# Patient Record
Sex: MALE | Race: WHITE | NOT HISPANIC OR LATINO | URBAN - METROPOLITAN AREA
[De-identification: names, ages, dates, MRNs, and addresses within clinical notes are randomized per-mention and may not be internally consistent; named-entity substitution may affect disease eponyms.]

---

## 2020-09-15 ENCOUNTER — TRANSCRIBE ORDERS (OUTPATIENT)
Dept: URGENT CARE | Facility: CLINIC | Age: 35
End: 2020-09-15

## 2020-09-15 DIAGNOSIS — Z02.1 ENCOUNTER FOR PRE-EMPLOYMENT EXAMINATION: Primary | ICD-10-CM

## 2020-09-15 PROCEDURE — 86480 TB TEST CELL IMMUN MEASURE: CPT | Performed by: FAMILY MEDICINE

## 2020-12-19 ENCOUNTER — IMMUNIZATIONS (OUTPATIENT)
Dept: FAMILY MEDICINE CLINIC | Facility: HOSPITAL | Age: 35
End: 2020-12-19
Payer: COMMERCIAL

## 2020-12-19 DIAGNOSIS — Z23 ENCOUNTER FOR IMMUNIZATION: ICD-10-CM

## 2020-12-19 PROCEDURE — 91300 SARS-COV-2 / COVID-19 MRNA VACCINE (PFIZER-BIONTECH) 30 MCG: CPT

## 2020-12-19 PROCEDURE — 0001A SARS-COV-2 / COVID-19 MRNA VACCINE (PFIZER-BIONTECH) 30 MCG: CPT

## 2021-01-07 ENCOUNTER — IMMUNIZATIONS (OUTPATIENT)
Dept: FAMILY MEDICINE CLINIC | Facility: HOSPITAL | Age: 36
End: 2021-01-07

## 2021-01-07 DIAGNOSIS — Z23 ENCOUNTER FOR IMMUNIZATION: ICD-10-CM

## 2021-01-07 PROCEDURE — 0002A SARS-COV-2 / COVID-19 MRNA VACCINE (PFIZER-BIONTECH) 30 MCG: CPT

## 2021-01-07 PROCEDURE — 91300 SARS-COV-2 / COVID-19 MRNA VACCINE (PFIZER-BIONTECH) 30 MCG: CPT

## 2021-04-27 ENCOUNTER — TELEPHONE (OUTPATIENT)
Dept: NEUROLOGY | Facility: CLINIC | Age: 36
End: 2021-04-27

## 2021-04-27 NOTE — TELEPHONE ENCOUNTER
Spoke with Mr Juliana Das who is self referring Headache / Vascular Imaging [facility] I will contact patient to schedule

## 2021-04-27 NOTE — TELEPHONE ENCOUNTER
Best contact number for MNCTULQ:[phone]    Emergency Contact name and number:None    Referring provider and telephone number: Self Ref [address] [facility]     Primary Care Provider Name and if affiliated with [facility]  Luke's: Currently has no pcp     Reason for Appointment/Dx:Headache     Have you seen and followed up with a pediatric Neurologist for this disease in the past?  No        Neurology Location patient would like to be seen:[facility] received? Self Ref [address] [facility]                                                Records Received? No    Have you ever seen another Neurologist?       No    Insurance Information    Insurance Name:[address]     ID/Policy #:    Secondary Insurance:    ID/Policy#: Workman's Comp/ Accident/ School  Information      Workman's Comp/Accident/School related?        None    If yes name of Insurance company:    Claim #:    Date of Injury:    Type of Injury:     Name and Telephone Number:    Notes:Appointment schedule with patient new patient pack sent  Self Ref patient [facility]                          Appointment date: 05- 4:00pm with Dr Glenford Brunner

## 2021-05-04 ENCOUNTER — CONSULT (OUTPATIENT)
Dept: NEUROLOGY | Facility: CLINIC | Age: 36
End: 2021-05-04
Payer: COMMERCIAL

## 2021-05-04 VITALS
SYSTOLIC BLOOD PRESSURE: 125 MMHG | WEIGHT: 167.1 LBS | DIASTOLIC BLOOD PRESSURE: 77 MMHG | HEIGHT: 75 IN | HEART RATE: 95 BPM | BODY MASS INDEX: 20.78 KG/M2 | TEMPERATURE: 98.3 F

## 2021-05-04 DIAGNOSIS — G44.019 EPISODIC CLUSTER HEADACHE, NOT INTRACTABLE: Primary | ICD-10-CM

## 2021-05-04 PROCEDURE — 99204 OFFICE O/P NEW MOD 45 MIN: CPT | Performed by: PSYCHIATRY & NEUROLOGY

## 2021-05-04 PROCEDURE — 3008F BODY MASS INDEX DOCD: CPT | Performed by: PSYCHIATRY & NEUROLOGY

## 2021-05-04 RX ORDER — SUMATRIPTAN 6 MG/.5ML
6 INJECTION, SOLUTION SUBCUTANEOUS DAILY PRN
Qty: 4 ML | Refills: 1 | Status: SHIPPED | OUTPATIENT
Start: 2021-05-04

## 2021-05-04 RX ORDER — INDOMETHACIN 50 MG/1
50 CAPSULE ORAL DAILY PRN
Qty: 60 CAPSULE | Refills: 0 | Status: SHIPPED | OUTPATIENT
Start: 2021-05-04

## 2021-05-04 NOTE — PATIENT INSTRUCTIONS
Trigeminal autonomic cephalalgia:  Dr Chelsea Russo presents for an initial consultation with regard to what sounds like a trigeminal autonomic cephalalgia  In particular this could be consistent with either cluster headache or paroxysmal hemicrania although I would typically favor the former of the 2  He has been doing well for the last 3 nights with the assistance of melatonin and previously had been using rise a triptan with limited benefit  His neurologic exam is excellent in the office today  - for ongoing prevention for the time being he would be cleared from my standpoint to continue his current dose of melatonin at 10 mg at night  If he remains headache free for 3-4 weeks would be reasonable to start to reduce the dose slowly and wean to off     - if he does have a breakthrough cluster he should use 6 mg of subcutaneous sumatriptan immediately or as early as possible  He could potentially combined that with indomethacin 50 mg however considering that these headaches are occurring in the middle of the night his stomach is likely to be empty  He could consider taking the medication with a little milk or antacid to help protect his stomach  - if he has ongoing breakthrough clusters will likely consider continuous indomethacin 3 times daily, Emgality which has the FDA approval for cluster headache, or a steroid bridge  Anyone of the 3 would be reasonable potentially   -at this point he is not in need of cerebrovascular imaging  I will plan for him to return to see me in 6 months time however if he has any breakthroughs whatsoever I would like a phone call and we will consider at that point in time whether not he would like to begin a preventative medication for a time

## 2021-05-04 NOTE — PROGRESS NOTES
Patient ID: Anne Marie Escamilla is a 37 y o  male  Assessment/Plan:   Patient Instructions   Trigeminal autonomic cephalalgia:  Dr Ham Bray presents for an initial consultation with regard to what sounds like a trigeminal autonomic cephalalgia  In particular this could be consistent with either cluster headache or paroxysmal hemicrania although I would typically favor the former of the 2  He has been doing well for the last 3 nights with the assistance of melatonin and previously had been using rise a triptan with limited benefit  His neurologic exam is excellent in the office today  - for ongoing prevention for the time being he would be cleared from my standpoint to continue his current dose of melatonin at 10 mg at night  If he remains headache free for 3-4 weeks would be reasonable to start to reduce the dose slowly and wean to off     - if he does have a breakthrough cluster he should use 6 mg of subcutaneous sumatriptan immediately or as early as possible  He could potentially combined that with indomethacin 50 mg however considering that these headaches are occurring in the middle of the night his stomach is likely to be empty  He could consider taking the medication with a little milk or antacid to help protect his stomach  - if he has ongoing breakthrough clusters will likely consider continuous indomethacin 3 times daily, Emgality which has the FDA approval for cluster headache, or a steroid bridge  Anyone of the 3 would be reasonable potentially   -at this point he is not in need of cerebrovascular imaging  I will plan for him to return to see me in 6 months time however if he has any breakthroughs whatsoever I would like a phone call and we will consider at that point in time whether not he would like to begin a preventative medication for a time  No problem-specific Assessment & Plan notes found for this encounter         Diagnoses and all orders for this visit:    Episodic cluster headache, not intractable  -     SUMAtriptan (IMITREX) 6 mg/0 5 mL; Inject 0 5 mL (6 mg total) under the skin daily as needed (for cluster )  -     indomethacin (INDOCIN) 50 mg capsule; Take 1 capsule (50 mg total) by mouth daily as needed (for cluster attack)           Subjective:    HPI     For Evelio Montez presents for an initial consultation with regard to headaches  He reports that over the course of the last few weeks he has started to experience what sound like episodes of trigeminal autonomic cephalalgia potentially cluster headache  These episodes occur only at night and typically wake him up regularly at midnight  The pain is extremely severe affecting all in the left brinda orbital/retro-orbital region  The pain is extremely severe  He is accompanied by unilateral autonomic symptoms including constant Yovany injection, tearing, rhinorrhea  The symptoms themselves may last for approximately 1 hour with rise a triptan  He does have a brother with an AVM but no family history of aneurysm  He did have 1 prior episode of 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  He was able to treat this/get it to resolve  He reports that he had an MRI during that initial episode  We discussed several different strategies for prevention and treatment as described above  Although he had been experiencing headache every single night he has not had 1 in the last 3 days since he started melatonin at a dose of 10 mg taken once daily at night  No past medical history on file      Social History     Socioeconomic History    Marital status: Unknown     Spouse name: Not on file    Number of children: Not on file    Years of education: Not on file    Highest education level: Not on file   Occupational History    Not on file   Social Needs    Financial resource strain: Not on file    Food insecurity     Worry: Not on file     Inability: Not on file   [employer] needs Medical: Not on file     Non-medical: Not on file   Tobacco Use    Smoking status: Not on file   Substance and Sexual Activity    Alcohol use: Not on file    Drug use: Not on file    Sexual activity: Not on file   Lifestyle    Physical activity     Days per week: Not on file     Minutes per session: Not on file    Stress: Not on file   Relationships    Social connections     Talks on phone: Not on file     Gets together: Not on file     Attends Oriental orthodox service: Not on file     Active member of club or organization: Not on file     Attends meetings of clubs or organizations: Not on file     Relationship status: Not on file    Intimate partner violence     Fear of current or ex partner: Not on file     Emotionally abused: Not on file     Physically abused: Not on file     Forced sexual activity: Not on file   Other Topics Concern    Not on file   Social History Narrative    Not on file       No current outpatient medications on file  Not on File          Objective:    /77 (BP Location: Left arm, Patient Position: Sitting, Cuff Size: Standard)   Pulse 95   Temp 98 3 °F (36 8 °C)   Ht 6' 3" (1 905 m)   Wt 75 8 kg (167 lb 1 6 oz)   BMI 20 89 kg/m²       Physical Exam    Neurological Exam     At the time of my examination he is awake, alert, and in no distress  There is no dysarthria or aphasia  Cranial nerves 2-12 were symmetrically intact bilaterally  Motor testing reveals 5/5 strength throughout the bilateral upper and lower extremities  Finger-to-nose reveals no ataxia, tremor, or loss of proprioceptive function  Sensation is intact to temperature and vibration in the bilateral upper and lower extremities  Funduscopic exam reveals no papilledema bilaterally  Deep tendon reflexes are symmetric  His gait is stable  ROS:    Review of Systems   Constitutional: Negative  Negative for appetite change and fever  HENT: Negative    Negative for hearing loss, tinnitus, trouble swallowing and voice change  Eyes: Positive for pain  Negative for photophobia  Respiratory: Negative  Negative for shortness of breath  Cardiovascular: Negative  Negative for palpitations  Gastrointestinal: Negative  Negative for nausea and vomiting  Endocrine: Negative  Negative for cold intolerance  Genitourinary: Negative  Negative for dysuria, frequency and urgency  Musculoskeletal: Negative  Negative for myalgias and neck pain  Skin: Negative  Negative for rash  Neurological: Positive for headaches  Negative for dizziness, tremors, seizures, syncope, facial asymmetry, speech difficulty, weakness, light-headedness and numbness  Hematological: Negative  Does not bruise/bleed easily  Psychiatric/Behavioral: Negative  Negative for confusion, hallucinations and sleep disturbance  Reviewed ROS as entered by medical assistant

## 2021-12-16 ENCOUNTER — IMMUNIZATIONS (OUTPATIENT)
Dept: FAMILY MEDICINE CLINIC | Facility: HOSPITAL | Age: 36
End: 2021-12-16

## 2021-12-16 DIAGNOSIS — Z23 ENCOUNTER FOR IMMUNIZATION: Primary | ICD-10-CM

## 2021-12-16 PROCEDURE — 0001A COVID-19 PFIZER VACC 0.3 ML: CPT

## 2021-12-16 PROCEDURE — 91300 COVID-19 PFIZER VACC 0.3 ML: CPT

## 2021-12-21 ENCOUNTER — TELEPHONE (OUTPATIENT)
Dept: INTERNAL MEDICINE CLINIC | Facility: CLINIC | Age: 36
End: 2021-12-21

## 2021-12-21 DIAGNOSIS — Z20.822 ENCOUNTER FOR SCREENING LABORATORY TESTING FOR COVID-19 VIRUS IN ASYMPTOMATIC PATIENT: Primary | ICD-10-CM

## 2021-12-21 PROCEDURE — U0005 INFEC AGEN DETEC AMPLI PROBE: HCPCS | Performed by: INTERNAL MEDICINE

## 2021-12-21 PROCEDURE — U0003 INFECTIOUS AGENT DETECTION BY NUCLEIC ACID (DNA OR RNA); SEVERE ACUTE RESPIRATORY SYNDROME CORONAVIRUS 2 (SARS-COV-2) (CORONAVIRUS DISEASE [COVID-19]), AMPLIFIED PROBE TECHNIQUE, MAKING USE OF HIGH THROUGHPUT TECHNOLOGIES AS DESCRIBED BY CMS-2020-01-R: HCPCS | Performed by: INTERNAL MEDICINE

## 2022-10-13 DIAGNOSIS — H57.12 LEFT EYE PAIN: Primary | ICD-10-CM

## 2022-10-13 RX ORDER — TOBRAMYCIN AND DEXAMETHASONE 3; 1 MG/ML; MG/ML
1 SUSPENSION/ DROPS OPHTHALMIC
Qty: 5 ML | Refills: 0 | Status: SHIPPED | OUTPATIENT
Start: 2022-10-13

## 2022-10-31 ENCOUNTER — NEW PATIENT (OUTPATIENT)
Dept: URBAN - METROPOLITAN AREA CLINIC 6 | Facility: CLINIC | Age: 37
End: 2022-10-31

## 2022-10-31 DIAGNOSIS — S05.01XA: ICD-10-CM

## 2022-10-31 PROCEDURE — 92002 INTRM OPH EXAM NEW PATIENT: CPT

## 2022-10-31 ASSESSMENT — VISUAL ACUITY
OD_SC: 20/40
OS_SC: 20/30

## 2022-12-20 DIAGNOSIS — R09.81 CONGESTION OF NASAL SINUS: Primary | ICD-10-CM

## 2022-12-20 RX ORDER — AZITHROMYCIN 250 MG/1
TABLET, FILM COATED ORAL
Qty: 6 TABLET | Refills: 0 | Status: SHIPPED | OUTPATIENT
Start: 2022-12-20 | End: 2022-12-24

## 2022-12-20 RX ORDER — AZITHROMYCIN 250 MG/1
TABLET, FILM COATED ORAL
Qty: 6 TABLET | Refills: 0 | Status: SHIPPED | OUTPATIENT
Start: 2022-12-20 | End: 2022-12-20

## 2023-03-17 DIAGNOSIS — R09.81 CONGESTION OF NASAL SINUS: Primary | ICD-10-CM

## 2023-03-17 RX ORDER — AMOXICILLIN 500 MG/1
500 CAPSULE ORAL EVERY 8 HOURS SCHEDULED
Qty: 21 CAPSULE | Refills: 0 | Status: SHIPPED | OUTPATIENT
Start: 2023-03-17 | End: 2023-03-17

## 2023-03-17 RX ORDER — AMOXICILLIN 500 MG/1
500 CAPSULE ORAL EVERY 8 HOURS SCHEDULED
Qty: 21 CAPSULE | Refills: 0 | Status: SHIPPED | OUTPATIENT
Start: 2023-03-17 | End: 2023-03-24

## 2023-06-19 DIAGNOSIS — Z20.1 EXPOSURE TO MYCOBACTERIUM TUBERCULOSIS: Primary | ICD-10-CM

## 2023-06-20 ENCOUNTER — APPOINTMENT (OUTPATIENT)
Dept: LAB | Facility: CLINIC | Age: 38
End: 2023-06-20

## 2023-06-20 PROCEDURE — 36415 COLL VENOUS BLD VENIPUNCTURE: CPT

## 2023-06-20 PROCEDURE — 86480 TB TEST CELL IMMUN MEASURE: CPT

## 2023-06-22 LAB
GAMMA INTERFERON BACKGROUND BLD IA-ACNC: 0.04 IU/ML
M TB IFN-G BLD-IMP: NEGATIVE
M TB IFN-G CD4+ BCKGRND COR BLD-ACNC: 0 IU/ML
M TB IFN-G CD4+ BCKGRND COR BLD-ACNC: 0 IU/ML
MITOGEN IGNF BCKGRD COR BLD-ACNC: >10 IU/ML

## 2023-08-10 ENCOUNTER — OFFICE VISIT (OUTPATIENT)
Dept: CARDIOLOGY CLINIC | Facility: CLINIC | Age: 38
End: 2023-08-10
Payer: COMMERCIAL

## 2023-08-10 VITALS
SYSTOLIC BLOOD PRESSURE: 118 MMHG | DIASTOLIC BLOOD PRESSURE: 72 MMHG | WEIGHT: 170 LBS | BODY MASS INDEX: 21.25 KG/M2 | OXYGEN SATURATION: 100 % | HEART RATE: 91 BPM

## 2023-08-10 DIAGNOSIS — I48.0 PAROXYSMAL ATRIAL FIBRILLATION (HCC): Primary | ICD-10-CM

## 2023-08-10 PROCEDURE — 99244 OFF/OP CNSLTJ NEW/EST MOD 40: CPT | Performed by: INTERNAL MEDICINE

## 2023-08-10 RX ORDER — OMEPRAZOLE 20 MG/1
20 CAPSULE, DELAYED RELEASE ORAL
COMMUNITY

## 2023-08-10 RX ORDER — METOPROLOL SUCCINATE 25 MG/1
25 TABLET, EXTENDED RELEASE ORAL DAILY
COMMUNITY
Start: 2023-08-10

## 2023-08-13 PROCEDURE — 93000 ELECTROCARDIOGRAM COMPLETE: CPT | Performed by: INTERNAL MEDICINE

## 2023-08-13 NOTE — PROGRESS NOTES
Consultation - Cardiology   Saira Larsen 30 y.o. male MRN: [id number]  Unit/Bed#:  Encounter: [id number]  Physician Requesting Consult: No att. providers found  Reason for Consult / Principal Problem:   PAF      Assessment:  1. PAF    Plan:  Initial short term plan was to continue Toprol XL 25 mg daily, get an EST, sleep study, echo. Then d/c Toprol and uses Flecainide as needed ( 300 mg ) for prolonged AF episodes. AF ablation was discussed with him and he is interested. After I discussed the case with EP ( Dr Hardy Kenney ), I called the patient and informed his that AF ablation would be a very good 1st line option. He is agreeable to discuss further with Dr Hardy Kenney. I told him to continue on the Toprol XL 25 mg daily, cancel the EST and echo but still get the sleep study. RTO 3 months. History of Present Illness     HPI: Saira Larsen is a 30y.o. year old male. He denies any past cardiac history or significant past medical history. He denies HTN, DM, smoking, vascular disease, thyroid disease, lung disease. He does not smoke. He drinks alcohol socially. On 8/8/2023, he had abrupt onset of tachycardia last about 45 minutes and then resolving spontaneously. He felt SOB and weak. After the episode stopped he was " wiped out ". On 8/9/2023, he had the same thing happen which lasted over an hour and he went to the ER in [facility]. Telemetry showed PAF with HRs up to 150 - 160 BPM. Documentation strips are in [facility]. He left the ER in [place] and was started on Toprol XL 25 mg daily. He has not no further episodes. CHADS-VAS zero. He does snore and his wife who is a MD has suggested that he get a sleep study. He has 1 cup of coffee / day and one Pepsi.     PMH - cluster headaches    ECHO 2/20/2020 - EF 60%, normal          Review of Systems:    Alert awake oriented, comfortable, denies any complaints  No fevers chills nausea vomiting  No weakness, dizziness, seizures  no cough, shortness of breath, or wheezing  Denies any palpitations, chest pain, diaphoresis  Denies leg edema, pain or paresthesias  Denies any skin rashes  Denies abdominal pain, bloody stools, masses  Denies any depression or suicidal ideations      Historical Information   No past medical history on file. No past surgical history on file. Social History     Substance and Sexual Activity   Alcohol Use Not on file     Social History     Substance and Sexual Activity   Drug Use Not on file     Social History     Tobacco Use   Smoking Status Not on file   Smokeless Tobacco Not on file     Family History: non-contributory    Meds/Allergies   all current active meds have been reviewed  No Known Allergies    Objective   Vitals: Blood pressure 118/72, pulse 91, weight 77.1 kg (170 lb), SpO2 100 %. , Body mass index is 21.25 kg/m². ,   Weight (last 2 days)     None                    Physical Exam:  GEN: Orion Pennington appears well, alert and oriented x 3, pleasant and cooperative   HEENT: pupils equal, round, and reactive to light; extraocular muscles intact  NECK: supple, no carotid bruits   HEART: regular rhythm, normal S1 and S2, no murmurs, clicks, gallops or rubs   LUNGS: clear to auscultation bilaterally; no wheezes, rales, or rhonchi   ABDOMEN: normal bowel sounds, soft, no tenderness, no distention  EXTREMITIES: peripheral pulses normal; no clubbing, cyanosis, or edema  NEURO: no focal findings   SKIN: normal without suspicious lesions on exposed skin    Lab Results:   No visits with results within 1 Day(s) from this visit.    Latest known visit with results is:   Orders Only on 06/19/2023   Component Date Value Ref Range Status   • QFT Nil 06/20/2023 0.04  0 - 8.0 IU/ml Final   • QFT TB1-NIL 06/20/2023 0.00  IU/ml Final   • QFT TB2-NIL 06/20/2023 0.00  IU/ml Final   • QFT Mitogen-NIL 06/20/2023 >10.00  IU/ml Final   • QFT Final Interpretation 06/20/2023 Negative  Negative Final    No Interferon-gamma response to M. tuberculosis antigens detected. Infection with M. tuberculosis is unlikely. A single negative result does not exclude infection with M. tuberculosis. In patients at high risk for M. tuberculosis infection, a second test should be considered in accordance with the 2017 ATS/IDSA/CDC Clinical Practice Guidelines for Diagnosis of Tuberculosis in Adults and Children. False negative results can be a result of incorrect blood sample collection or handling of the specimen affecting lymphocyte function. Imaging: I have personally reviewed pertinent reports.

## 2023-08-14 ENCOUNTER — TELEPHONE (OUTPATIENT)
Dept: SLEEP CENTER | Facility: CLINIC | Age: 38
End: 2023-08-14

## 2023-08-14 NOTE — TELEPHONE ENCOUNTER
----- Message from Bethany Anderson MD sent at 8/12/2023 11:27 PM EDT -----  Approved    ----- Message -----  From: Yaritza Olmos  Sent: 8/11/2023   7:33 AM EDT  To: Sleep Medicine [facility] Provider    This Home sleep study needs approval.     If approved please sign and return to clerical pool. If denied please include reasons why. Also provide alternative testing if warranted. Please sign and return to clerical pool.

## 2023-08-15 ENCOUNTER — PREP FOR PROCEDURE (OUTPATIENT)
Dept: CARDIOLOGY CLINIC | Facility: CLINIC | Age: 38
End: 2023-08-15

## 2023-08-15 ENCOUNTER — TELEPHONE (OUTPATIENT)
Dept: CARDIOLOGY CLINIC | Facility: CLINIC | Age: 38
End: 2023-08-15

## 2023-08-15 DIAGNOSIS — I48.0 PAROXYSMAL ATRIAL FIBRILLATION (HCC): Primary | ICD-10-CM

## 2023-08-15 NOTE — TELEPHONE ENCOUNTER
Patient scheduled for BRANDY/A fib at [facility] on 09/15/2023  with Dr Galloway. Patient will schedule his CT PV at [facility].     Patient aware of general instructions, labs test required.   No Meds holds    Can we please check insurance for approval.

## 2023-08-15 NOTE — TELEPHONE ENCOUNTER
Fili Musa can you please arrange a BRANDY a fabulation on Dr. Sampson Butter he will need a CT of his pulmonary veins prior. Let’s try to do it on a Friday so that he can skip Monday and go back to work Tuesday. He may want it done as soon as possible. I’m not sure because he’s feeling poorly whatever he wants. Tram Baeza he knows him. Please send me a message in epic to meet with him in person or call him on the phone or something prior. I spoke to him on the phone today.

## 2023-08-15 NOTE — TELEPHONE ENCOUNTER
Called patient in regard BRANDY/Afib ablation refer by Dr Willi Valencia to be perform by Dr Karly Durant. LVM to call us back.

## 2023-08-29 ENCOUNTER — HOSPITAL ENCOUNTER (OUTPATIENT)
Dept: RADIOLOGY | Facility: HOSPITAL | Age: 38
Discharge: HOME | End: 2023-08-29
Attending: INTERNAL MEDICINE
Payer: COMMERCIAL

## 2023-08-29 ENCOUNTER — HOSPITAL ENCOUNTER (OUTPATIENT)
Dept: NON INVASIVE DIAGNOSTICS | Facility: CLINIC | Age: 38
Discharge: HOME | End: 2023-08-29
Payer: COMMERCIAL

## 2023-08-29 VITALS
WEIGHT: 170 LBS | OXYGEN SATURATION: 98 % | HEIGHT: 73 IN | SYSTOLIC BLOOD PRESSURE: 100 MMHG | BODY MASS INDEX: 22.53 KG/M2 | RESPIRATION RATE: 16 BRPM | DIASTOLIC BLOOD PRESSURE: 78 MMHG | HEART RATE: 71 BPM

## 2023-08-29 VITALS
BODY MASS INDEX: 21.14 KG/M2 | DIASTOLIC BLOOD PRESSURE: 72 MMHG | HEIGHT: 75 IN | HEART RATE: 65 BPM | SYSTOLIC BLOOD PRESSURE: 118 MMHG | WEIGHT: 170 LBS

## 2023-08-29 DIAGNOSIS — I48.0 PAROXYSMAL ATRIAL FIBRILLATION (HCC): ICD-10-CM

## 2023-08-29 LAB
AORTIC ROOT: 2.8 CM
APICAL FOUR CHAMBER EJECTION FRACTION: 61 %
ASCENDING AORTA: 2.6 CM
CHEST PAIN STATEMENT: NORMAL
E WAVE DECELERATION TIME: 205 MS
FRACTIONAL SHORTENING: 33 (ref 28–44)
INTERVENTRICULAR SEPTUM IN DIASTOLE (PARASTERNAL SHORT AXIS VIEW): 0.6 CM
INTERVENTRICULAR SEPTUM: 0.6 CM (ref 0.6–1.1)
LAAS-AP2: 16.4 CM2
LAAS-AP4: 11.8 CM2
LEFT ATRIUM SIZE: 3.3 CM
LEFT ATRIUM VOLUME (MOD BIPLANE): 38 ML
LEFT INTERNAL DIMENSION IN SYSTOLE: 2.8 CM (ref 2.1–4)
LEFT VENTRICULAR INTERNAL DIMENSION IN DIASTOLE: 4.2 CM (ref 3.5–6)
LEFT VENTRICULAR POSTERIOR WALL IN END DIASTOLE: 0.9 CM
LEFT VENTRICULAR STROKE VOLUME: 48 ML
LVSV (TEICH): 48 ML
MAX DIASTOLIC BP: 76 MMHG
MAX HEART RATE: 164 BPM
MAX HR PERCENT: 88 %
MAX HR: 164 BPM
MAX PREDICTED HEART RATE: 185 BPM
MAX. SYSTOLIC BP: 138 MMHG
MV E'TISSUE VEL-SEP: 16 CM/S
MV PEAK A VEL: 0.47 M/S
MV PEAK E VEL: 109 CM/S
MV STENOSIS PRESSURE HALF TIME: 59 MS
MV VALVE AREA P 1/2 METHOD: 3.73
PROTOCOL NAME: NORMAL
RATE PRESSURE PRODUCT: NORMAL
RIGHT ATRIUM AREA SYSTOLE A4C: 13.6 CM2
RIGHT VENTRICLE ID DIMENSION: 3.7 CM
SL CV LEFT ATRIUM LENGTH A2C: 4.8 CM
SL CV LV EF: 65
SL CV PED ECHO LEFT VENTRICLE DIASTOLIC VOLUME (MOD BIPLANE) 2D: 78 ML
SL CV PED ECHO LEFT VENTRICLE SYSTOLIC VOLUME (MOD BIPLANE) 2D: 30 ML
SL CV STRESS RECOVERY BP: NORMAL MMHG
SL CV STRESS RECOVERY HR: 95 BPM
SL CV STRESS RECOVERY O2 SAT: 99 %
STRESS ANGINA INDEX: 0
STRESS BASELINE BP: NORMAL MMHG
STRESS BASELINE HR: 71 BPM
STRESS O2 SAT REST: 98 %
STRESS PEAK HR: 164 BPM
STRESS POST ESTIMATED WORKLOAD: 17.2 METS
STRESS POST EXERCISE DUR MIN: 13 MIN
STRESS POST EXERCISE DUR SEC: 0 SEC
STRESS POST O2 SAT PEAK: 99 %
STRESS POST PEAK BP: 124 MMHG
TARGET HR FORMULA: NORMAL
TIME IN EXERCISE PHASE: NORMAL
TRICUSPID ANNULAR PLANE SYSTOLIC EXCURSION: 2.5 CM

## 2023-08-29 PROCEDURE — 93306 TTE W/DOPPLER COMPLETE: CPT

## 2023-08-29 PROCEDURE — 93018 CV STRESS TEST I&R ONLY: CPT | Performed by: INTERNAL MEDICINE

## 2023-08-29 PROCEDURE — 75572 CT HRT W/3D IMAGE: CPT

## 2023-08-29 PROCEDURE — 93017 CV STRESS TEST TRACING ONLY: CPT

## 2023-08-29 PROCEDURE — G1004 CDSM NDSC: HCPCS

## 2023-08-29 PROCEDURE — 93306 TTE W/DOPPLER COMPLETE: CPT | Performed by: INTERNAL MEDICINE

## 2023-08-29 PROCEDURE — 93016 CV STRESS TEST SUPVJ ONLY: CPT | Performed by: INTERNAL MEDICINE

## 2023-08-29 RX ORDER — IODIXANOL 320 MG/ML
120 INJECTION, SOLUTION INTRAVASCULAR
Status: COMPLETED | OUTPATIENT
Start: 2023-08-29 | End: 2023-08-29

## 2023-08-29 RX ADMIN — IODIXANOL 120 ML: 320 INJECTION, SOLUTION INTRAVASCULAR at 08:00

## 2023-09-05 ENCOUNTER — TELEPHONE (OUTPATIENT)
Dept: CARDIOLOGY CLINIC | Facility: CLINIC | Age: 38
End: 2023-09-05

## 2023-09-05 NOTE — TELEPHONE ENCOUNTER
----- Message from Pearl Mercado DO sent at 9/4/2023  5:21 PM EDT -----  Can we please get his tele strips from [address] and any ecg from there. Also Dr. Celine Curtis may have some strips of afib to scan into the chart. Thanks.     [employer]

## 2023-09-05 NOTE — TELEPHONE ENCOUNTER
Medical records request faxed to [facility], Attn: Medical records @ [phone].

## 2023-09-08 ENCOUNTER — HOSPITAL ENCOUNTER (OUTPATIENT)
Dept: SLEEP CENTER | Facility: CLINIC | Age: 38
Discharge: HOME | End: 2023-09-08
Payer: COMMERCIAL

## 2023-09-08 DIAGNOSIS — I48.0 PAROXYSMAL ATRIAL FIBRILLATION (HCC): ICD-10-CM

## 2023-09-08 PROCEDURE — G0399 HOME SLEEP TEST/TYPE 3 PORTA: HCPCS

## 2023-09-11 NOTE — TELEPHONE ENCOUNTER
Dieudonne Davila, I see you scanned some medical records for this patient from [facility], by any chance did they send any labs, patient mentioned had labs done around 8/15/2023, or do you have any phone number for them? Thank you.

## 2023-09-11 NOTE — PROGRESS NOTES
Home Sleep Study Documentation    HOME STUDY DEVICE: Noxturnal no                                           Margarita G3 yes      Pre-Sleep Home Study:    Set-up and instructions performed by: Tejinder Sullivan    Technician performed demonstration for Patient: Yes    Return demonstration performed by Patient: yes    Written instructions provided to Patient: yes    Patient signed consent form: yes        Post-Sleep Home Study:    Additional comments by Patient:     Home Sleep Study Failed:no:    Failure reason: N/A    Reported or Detected: N/A    Scored by: Darby Guevara

## 2023-09-15 PROCEDURE — 95806 SLEEP STUDY UNATT&RESP EFFT: CPT | Performed by: PSYCHIATRY & NEUROLOGY

## 2023-09-21 ENCOUNTER — TELEPHONE (OUTPATIENT)
Dept: SLEEP CENTER | Facility: CLINIC | Age: 38
End: 2023-09-21

## 2023-09-21 DIAGNOSIS — I48.0 PAROXYSMAL ATRIAL FIBRILLATION (HCC): Primary | ICD-10-CM

## 2023-09-21 RX ORDER — NEBIVOLOL 2.5 MG/1
2.5 TABLET ORAL DAILY
Qty: 90 TABLET | Refills: 3 | Status: SHIPPED | OUTPATIENT
Start: 2023-09-21

## 2023-09-21 NOTE — TELEPHONE ENCOUNTER
Call placed to patient. Left message home sleep study is resulted and to call the nursing staff to review the results and the provider's recommendations. Study does not show MUNIRA, shows snoring and airflow limitations. A diagnostic polysomnogram is recommended to rule out a false negative result, as a home sleep test may underestimate disease severity. Per study order patent to schedule consult with sleep medicine.

## 2023-11-29 DIAGNOSIS — I48.0 PAROXYSMAL ATRIAL FIBRILLATION (HCC): Primary | ICD-10-CM

## 2023-11-29 RX ORDER — NEBIVOLOL 5 MG/1
5 TABLET ORAL DAILY
Qty: 90 TABLET | Refills: 3 | Status: SHIPPED | OUTPATIENT
Start: 2023-11-29

## 2024-06-24 DIAGNOSIS — Z00.6 ENCOUNTER FOR EXAMINATION FOR NORMAL COMPARISON OR CONTROL IN CLINICAL RESEARCH PROGRAM: ICD-10-CM

## 2024-09-06 ENCOUNTER — CLINICAL SUPPORT (OUTPATIENT)
Dept: CARDIOLOGY CLINIC | Facility: CLINIC | Age: 39
End: 2024-09-06
Payer: COMMERCIAL

## 2024-09-06 DIAGNOSIS — R00.2 PALPITATIONS: ICD-10-CM

## 2024-09-06 DIAGNOSIS — I47.19 ATRIAL TACHYCARDIA: ICD-10-CM

## 2024-09-06 DIAGNOSIS — R00.2 PALPITATIONS: Primary | ICD-10-CM

## 2024-09-06 DIAGNOSIS — I47.19 ATRIAL TACHYCARDIA: Primary | ICD-10-CM

## 2024-09-06 PROCEDURE — 93246 EXT ECG>7D<15D RECORDING: CPT

## 2024-09-06 NOTE — PROGRESS NOTES
Pt presents to the office under the direction of Dr. Galloway for a 2 week Zio XT. Patch applied and all instructions given. Pt verbalized understanding.

## 2024-09-26 ENCOUNTER — CLINICAL SUPPORT (OUTPATIENT)
Dept: CARDIOLOGY CLINIC | Facility: CLINIC | Age: 39
End: 2024-09-26

## 2024-09-26 DIAGNOSIS — I47.19 ATRIAL TACHYCARDIA (HCC): ICD-10-CM

## 2024-09-26 DIAGNOSIS — R00.2 PALPITATIONS: ICD-10-CM

## 2024-10-15 NOTE — PROGRESS NOTES
Ambulatory extended monitor report.    No atrial fibrillation.  Fairly frequent pre-mature atrial contractions.  Non sustained atrial tachycardia    Patient had a min HR of 49 bpm, max HR of 188 bpm, and avg HR of 79 bpm. Predominant underlying rhythm was Sinus Rhythm. 25 Supraventricular Tachycardia runs occurred, the run with the fastest interval lasting 19 beats with a max rate of 188 bpm, the longest lasting 32.0 secs with an avg rate of 107 bpm. Some episodes of Supraventricular Tachycardia may be possible Atrial Tachycardia with variable block. Supraventricular Tachycardia was detected within +/- 45 seconds of symptomatic patient event(s). Isolated SVEs were occasional (1.7%, 51407), SVE Couplets were rare (<1.0%, 193), and SVE Triplets were rare (<1.0%, 46). Isolated VEs were rare (<1.0%), VE Couplets were rare (<1.0%), and no VE Triplets were present. Ventricular Bigeminy and Trigeminy were present.

## 2024-10-17 ENCOUNTER — TELEPHONE (OUTPATIENT)
Dept: CARDIOLOGY CLINIC | Facility: CLINIC | Age: 39
End: 2024-10-17

## 2024-10-17 NOTE — TELEPHONE ENCOUNTER
----- Message from Viki Galloway DO sent at 10/15/2024  5:44 PM EDT -----  Can you please track down all tele strips, ecgs and rhythm strips from [facility] August 2022.  He had afib then. Thanks.    viki

## 2024-10-17 NOTE — TELEPHONE ENCOUNTER
Sent a fax request to [facility] at fax #[phone] for pt's tele strips, ecgs and rhythm strips from August 2022.

## 2024-10-28 ENCOUNTER — TELEPHONE (OUTPATIENT)
Dept: CARDIOLOGY CLINIC | Facility: CLINIC | Age: 39
End: 2024-10-28

## 2024-10-28 DIAGNOSIS — I47.19 ATRIAL TACHYCARDIA (HCC): ICD-10-CM

## 2024-10-28 DIAGNOSIS — I48.0 PAROXYSMAL ATRIAL FIBRILLATION (HCC): Primary | ICD-10-CM

## 2024-10-28 NOTE — TELEPHONE ENCOUNTER
"----- Message from Chana FRANOC sent at 10/28/2024  8:25 AM EDT -----  From: Agustin Dee [email or web address]>   Sent: Monday, October 28, 2024 6:12 AM  To: Chana Blanco [email or web address]>  Subject: RE: LOOP IMPLANT W/ ON LAB DAY     Let's go with Thursday 11/14.     Thanks,  Sean    ----- Message -----  From: Agustin Dee RN  Sent: 10/28/2024   6:48 AM EDT  To: Chana Blanco    I was off Friday.  Sent you a date this morning.    Sean  ----- Message -----  From: Chana Blanco  Sent: 10/25/2024   2:45 PM EDT  To: Agustin Dee RN; Chana Blanco    Please see email I sent you and respond.     Thanks,  Chana \"Heath" Francis  ----- Message -----  From: Chana Blanco  Sent: 10/22/2024  12:56 PM EDT  To: Agustin Dee RN; Chana Estrada,    Per below message, this loop implant needs to be scheduled with .     Which is best:     Thur 11/14/24 L1    or     Fri 11/15/24 L3 (already has a Dejuan/Afib PFA)    Please advise.     Thanks,  Chana \"Carmelita\" Francis  ----- Message -----  From: Viki Galloway DO  Sent: 10/15/2024   5:55 PM EDT  To: Cardiology Surgery Coordinator    Please arrange ILR for paroxysmal atrial fibrillation and non-sustained atrial tachycardia.  We can do it any day that I am there. thanks    Thanks.    viki"

## 2024-10-28 NOTE — TELEPHONE ENCOUNTER
"Left voicemail on machine informing patient to call and schedule a LOOP Recorder Implant procedure. Sent  MyChart.    Thanks,  Chana \"Carmelita\" Francis    "

## 2024-10-29 ENCOUNTER — PREP FOR PROCEDURE (OUTPATIENT)
Dept: CARDIOLOGY CLINIC | Facility: CLINIC | Age: 39
End: 2024-10-29

## 2024-10-29 DIAGNOSIS — I47.19 ATRIAL TACHYCARDIA (HCC): ICD-10-CM

## 2024-10-29 DIAGNOSIS — I48.0 PAROXYSMAL ATRIAL FIBRILLATION (HCC): Primary | ICD-10-CM

## 2024-10-29 NOTE — TELEPHONE ENCOUNTER
"Patient is scheduled for LOOP Recorder Implant on 11/14/24 at [facility] with . Ok per Sean Dee to add case on this day.     Patient aware of all general instructions.    Instructions sent to patient through [employer].      Medication holds:   N/A    Blood Thinners:   N/A    Blood work to be done on 11/8/24:  CMP / CBC (FASTING 8 HOURS)      Thank you,  Chana \"Carmelita\" Francis      "

## 2024-11-12 ENCOUNTER — APPOINTMENT (OUTPATIENT)
Dept: LAB | Facility: CLINIC | Age: 39
End: 2024-11-12
Payer: COMMERCIAL

## 2024-11-12 DIAGNOSIS — Z00.6 ENCOUNTER FOR EXAMINATION FOR NORMAL COMPARISON OR CONTROL IN CLINICAL RESEARCH PROGRAM: ICD-10-CM

## 2024-11-12 LAB
ALBUMIN SERPL BCG-MCNC: 4.6 G/DL (ref 3.5–5)
ALP SERPL-CCNC: 74 U/L (ref 34–104)
ALT SERPL W P-5'-P-CCNC: 18 U/L (ref 7–52)
ANION GAP SERPL CALCULATED.3IONS-SCNC: 7 MMOL/L (ref 4–13)
AST SERPL W P-5'-P-CCNC: 20 U/L (ref 13–39)
BASOPHILS # BLD AUTO: 0.09 THOUSANDS/ÂΜL (ref 0–0.1)
BASOPHILS NFR BLD AUTO: 2 % (ref 0–1)
BILIRUB SERPL-MCNC: 0.8 MG/DL (ref 0.2–1)
BUN SERPL-MCNC: 15 MG/DL (ref 5–25)
CALCIUM SERPL-MCNC: 9.2 MG/DL (ref 8.4–10.2)
CHLORIDE SERPL-SCNC: 99 MMOL/L (ref 96–108)
CO2 SERPL-SCNC: 30 MMOL/L (ref 21–32)
CREAT SERPL-MCNC: 0.81 MG/DL (ref 0.6–1.3)
EOSINOPHIL # BLD AUTO: 0.42 THOUSAND/ÂΜL (ref 0–0.61)
EOSINOPHIL NFR BLD AUTO: 8 % (ref 0–6)
ERYTHROCYTE [DISTWIDTH] IN BLOOD BY AUTOMATED COUNT: 11.6 % (ref 11.6–15.1)
GFR SERPL CREATININE-BSD FRML MDRD: 113 ML/MIN/1.73SQ M
GLUCOSE P FAST SERPL-MCNC: 90 MG/DL (ref 65–99)
HCT VFR BLD AUTO: 44.5 % (ref 36.5–49.3)
HGB BLD-MCNC: 15.6 G/DL (ref 12–17)
IMM GRANULOCYTES # BLD AUTO: 0.02 THOUSAND/UL (ref 0–0.2)
IMM GRANULOCYTES NFR BLD AUTO: 0 % (ref 0–2)
LYMPHOCYTES # BLD AUTO: 2.05 THOUSANDS/ÂΜL (ref 0.6–4.47)
LYMPHOCYTES NFR BLD AUTO: 37 % (ref 14–44)
MCH RBC QN AUTO: 31.8 PG (ref 26.8–34.3)
MCHC RBC AUTO-ENTMCNC: 35.1 G/DL (ref 31.4–37.4)
MCV RBC AUTO: 91 FL (ref 82–98)
MONOCYTES # BLD AUTO: 0.52 THOUSAND/ÂΜL (ref 0.17–1.22)
MONOCYTES NFR BLD AUTO: 9 % (ref 4–12)
NEUTROPHILS # BLD AUTO: 2.51 THOUSANDS/ÂΜL (ref 1.85–7.62)
NEUTS SEG NFR BLD AUTO: 44 % (ref 43–75)
NRBC BLD AUTO-RTO: 0 /100 WBCS
PLATELET # BLD AUTO: 286 THOUSANDS/UL (ref 149–390)
PMV BLD AUTO: 9.7 FL (ref 8.9–12.7)
POTASSIUM SERPL-SCNC: 3.8 MMOL/L (ref 3.5–5.3)
PROT SERPL-MCNC: 6.7 G/DL (ref 6.4–8.4)
RBC # BLD AUTO: 4.9 MILLION/UL (ref 3.88–5.62)
SODIUM SERPL-SCNC: 136 MMOL/L (ref 135–147)
WBC # BLD AUTO: 5.61 THOUSAND/UL (ref 4.31–10.16)

## 2024-11-13 NOTE — DISCHARGE INSTR - AVS FIRST PAGE
It is acceptable to shower with the glue in place and Aquacel bandage in place.  Please remove Aquacel bandage in 1 week.  After that, glue will fall off in 1 week on its own, but we ask please do not scrub the area or swim during the next 14 days. Do not use lotions/powders/creams on incision. Please call the office [phone] if you notice redness, swelling, bleeding, or drainage from incision or if you develop fevers.      Cardiac Loop Recorder Insertion      WHAT YOU SHOULD KNOW:    A cardiac loop recorder is a device used to diagnose heart rhythm problems, such as a fast or irregular heartbeat. It is implanted in your left chest, just under the skin. The device records a pattern of your heart's rhythm, called an EKG. Your device records automatic EKGs, depending on how your caregiver programs it. You may also receive a handheld controller. You press a button on the controller when you have symptoms, such as dizziness, lightheadedness, or palpitations. The device will record an EKG at that moment. The recording can help your caregiver see if your symptoms may be caused by heart rhythm problems. Your caregiver will remove the device after it has collected enough data. You may need the device for up to 3 years. The procedure to remove the device is similar to the procedure used to implant it.      AFTER YOU LEAVE:    Follow up with your cardiologist as directed: You will need to return in 1 to 2 weeks. Your cardiologist will check your incision. He may also program your device settings again. He will retrieve data from the device every 1 to 3 months with a monitor held over your skin. You may be able to transmit data from your device from home as well. You will do this by calling a number provided by your cardiologist, or as they have instructed you. Ask for information about this process. Write down your questions so you remember to ask them during your visits.      Wound care: Keep loop recorder incision dry  for one week. Do not use lotions/powders/creams on incision. Remove outer bandage 48 hours after procedure - leave underlying steri-strips in place, they will either fall off on their own or will be removed at 2 week follow up appointment. Please call the office if you notice redness, swelling, bleeding, or drainage from incision or if you develop fevers. After that first week, carefully wash your incision with soap and water. Keep the area clean and dry until it heals.      Return to activity: If you received anesthesia, you will not be able to drive for 24 hours. Otherwise, most people can return to normal activities soon after the procedure. Your cardiologist may want to know if your work involves electrical current or high-voltage equipment. Ask about other electrical items that could interfere with your cardiac loop recorder.      Contact your cardiologist if:   You have a fever or chills.    Your wound is red, swollen, or draining pus.    You have questions or concerns about your condition or care.    Seek care immediately or call 911 if:   You feel weak, dizzy, or faint.    You lose consciousness.      © 2014 [employer] Information is for End User's use only and may not be sold, redistributed or otherwise used for commercial purposes. All illustrations and images included in CareNotes® are the copyrighted property of [employer] Inc[employer] or [employer].    The above information is an  only. It is not intended as medical advice for individual conditions or treatments. Talk to your doctor, nurse or pharmacist before following any medical regimen to see if it is safe and effective for you.

## 2024-11-14 ENCOUNTER — HOSPITAL ENCOUNTER (OUTPATIENT)
Facility: HOSPITAL | Age: 39
Setting detail: OUTPATIENT SURGERY
Discharge: HOME | End: 2024-11-14
Attending: INTERNAL MEDICINE | Admitting: INTERNAL MEDICINE
Payer: COMMERCIAL

## 2024-11-14 VITALS
OXYGEN SATURATION: 100 % | RESPIRATION RATE: 14 BRPM | SYSTOLIC BLOOD PRESSURE: 117 MMHG | HEART RATE: 72 BPM | TEMPERATURE: 97.5 F | DIASTOLIC BLOOD PRESSURE: 70 MMHG

## 2024-11-14 DIAGNOSIS — I48.0 PAROXYSMAL ATRIAL FIBRILLATION (HCC): ICD-10-CM

## 2024-11-14 DIAGNOSIS — I47.19 ATRIAL TACHYCARDIA (HCC): ICD-10-CM

## 2024-11-14 PROCEDURE — NC001 PR NO CHARGE: Performed by: PHYSICIAN ASSISTANT

## 2024-11-14 PROCEDURE — C1764 EVENT RECORDER, CARDIAC: HCPCS | Performed by: INTERNAL MEDICINE

## 2024-11-14 PROCEDURE — 33285 INSJ SUBQ CAR RHYTHM MNTR: CPT | Performed by: INTERNAL MEDICINE

## 2024-11-14 DEVICE — LOOP RECORDER REVEAL LINQ II SYS DEVICE ONLY: Type: IMPLANTABLE DEVICE | Site: CHEST  WALL | Status: FUNCTIONAL

## 2024-11-14 RX ORDER — LIDOCAINE HYDROCHLORIDE AND EPINEPHRINE 10; 10 MG/ML; UG/ML
INJECTION, SOLUTION INFILTRATION; PERINEURAL CODE/TRAUMA/SEDATION MEDICATION
Status: DISCONTINUED | OUTPATIENT
Start: 2024-11-14 | End: 2024-11-14 | Stop reason: HOSPADM

## 2024-11-14 NOTE — H&P
Patient is a 38 yo male with a history of PAF which was initially noted at [facility]. Recently he had episodes of abrupt tachycardia on 8/8/23 and 8/9/23 leaving him very fatigued with the later sending him to the hospital. He was diagnosed with PAF there with rates in the 150-160's.  He was seen by Dr. Guido here and started on metoprolol initially and this was eventually stopped in favor of flecainide. He had 2 week zio done without atrial arrhythmia. Loop will be implanted for long term monitoring.

## 2024-11-19 LAB
APOB+LDLR+PCSK9 GENE MUT ANL BLD/T: NOT DETECTED
BRCA1+BRCA2 DEL+DUP + FULL MUT ANL BLD/T: NOT DETECTED
MLH1+MSH2+MSH6+PMS2 GN DEL+DUP+FUL M: NOT DETECTED

## 2024-12-25 ENCOUNTER — RESULTS FOLLOW-UP (OUTPATIENT)
Dept: CARDIOLOGY CLINIC | Facility: CLINIC | Age: 39
End: 2024-12-25

## 2025-01-02 ENCOUNTER — APPOINTMENT (EMERGENCY)
Dept: PERIOP | Facility: HOSPITAL | Age: 40
End: 2025-01-02
Payer: COMMERCIAL

## 2025-01-02 ENCOUNTER — HOSPITAL ENCOUNTER (EMERGENCY)
Facility: HOSPITAL | Age: 40
Discharge: HOME | End: 2025-01-02
Attending: EMERGENCY MEDICINE | Admitting: EMERGENCY MEDICINE
Payer: COMMERCIAL

## 2025-01-02 ENCOUNTER — ANESTHESIA (EMERGENCY)
Dept: PERIOP | Facility: HOSPITAL | Age: 40
End: 2025-01-02
Payer: COMMERCIAL

## 2025-01-02 ENCOUNTER — ANESTHESIA EVENT (EMERGENCY)
Dept: PERIOP | Facility: HOSPITAL | Age: 40
End: 2025-01-02
Payer: COMMERCIAL

## 2025-01-02 VITALS
SYSTOLIC BLOOD PRESSURE: 123 MMHG | OXYGEN SATURATION: 97 % | TEMPERATURE: 97.4 F | HEART RATE: 73 BPM | DIASTOLIC BLOOD PRESSURE: 74 MMHG | RESPIRATION RATE: 16 BRPM

## 2025-01-02 DIAGNOSIS — T18.128A ESOPHAGEAL OBSTRUCTION DUE TO FOOD IMPACTION: ICD-10-CM
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
## 2025-01-02 DIAGNOSIS — T18.128A FOOD IMPACTION OF ESOPHAGUS, INITIAL ENCOUNTER: Primary | ICD-10-CM
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
## 2025-01-02 PROCEDURE — 43239 EGD BIOPSY SINGLE/MULTIPLE: CPT | Performed by: INTERNAL MEDICINE

## 2025-01-02 PROCEDURE — 99283 EMERGENCY DEPT VISIT LOW MDM: CPT

## 2025-01-02 PROCEDURE — 99245 OFF/OP CONSLTJ NEW/EST HI 55: CPT | Performed by: INTERNAL MEDICINE

## 2025-01-02 PROCEDURE — 88305 TISSUE EXAM BY PATHOLOGIST: CPT | Performed by: PATHOLOGY

## 2025-01-02 PROCEDURE — 99285 EMERGENCY DEPT VISIT HI MDM: CPT | Performed by: EMERGENCY MEDICINE

## 2025-01-02 RX ORDER — FENTANYL CITRATE/PF 50 MCG/ML
50 SYRINGE (ML) INJECTION
Status: DISCONTINUED | OUTPATIENT
Start: 2025-01-02 | End: 2025-01-02 | Stop reason: HOSPADM

## 2025-01-02 RX ORDER — SODIUM CHLORIDE, SODIUM LACTATE, POTASSIUM CHLORIDE, CALCIUM CHLORIDE 600; 310; 30; 20 MG/100ML; MG/100ML; MG/100ML; MG/100ML
125 INJECTION, SOLUTION INTRAVENOUS CONTINUOUS
Status: CANCELLED | OUTPATIENT
Start: 2025-01-02

## 2025-01-02 RX ORDER — OMEPRAZOLE 40 MG/1
40 CAPSULE, DELAYED RELEASE ORAL 2 TIMES DAILY
Qty: 180 CAPSULE | Refills: 2 | Status: SHIPPED | OUTPATIENT
Start: 2025-01-02

## 2025-01-02 RX ORDER — ONDANSETRON 2 MG/ML
4 INJECTION INTRAMUSCULAR; INTRAVENOUS ONCE AS NEEDED
Status: DISCONTINUED | OUTPATIENT
Start: 2025-01-02 | End: 2025-01-02 | Stop reason: HOSPADM

## 2025-01-02 RX ORDER — LIDOCAINE HYDROCHLORIDE 10 MG/ML
INJECTION, SOLUTION EPIDURAL; INFILTRATION; INTRACAUDAL; PERINEURAL AS NEEDED
Status: DISCONTINUED | OUTPATIENT
Start: 2025-01-02 | End: 2025-01-02

## 2025-01-02 RX ORDER — FENTANYL CITRATE 50 UG/ML
INJECTION, SOLUTION INTRAMUSCULAR; INTRAVENOUS AS NEEDED
Status: DISCONTINUED | OUTPATIENT
Start: 2025-01-02 | End: 2025-01-02

## 2025-01-02 RX ORDER — HYDROMORPHONE HCL IN WATER/PF 6 MG/30 ML
0.2 PATIENT CONTROLLED ANALGESIA SYRINGE INTRAVENOUS
Status: DISCONTINUED | OUTPATIENT
Start: 2025-01-02 | End: 2025-01-02 | Stop reason: HOSPADM

## 2025-01-02 RX ORDER — KETOROLAC TROMETHAMINE 30 MG/ML
INJECTION, SOLUTION INTRAMUSCULAR; INTRAVENOUS AS NEEDED
Status: DISCONTINUED | OUTPATIENT
Start: 2025-01-02 | End: 2025-01-02

## 2025-01-02 RX ORDER — PROMETHAZINE HYDROCHLORIDE 25 MG/ML
12.5 INJECTION, SOLUTION INTRAMUSCULAR; INTRAVENOUS ONCE AS NEEDED
Status: DISCONTINUED | OUTPATIENT
Start: 2025-01-02 | End: 2025-01-02 | Stop reason: HOSPADM

## 2025-01-02 RX ORDER — SODIUM CHLORIDE, SODIUM LACTATE, POTASSIUM CHLORIDE, CALCIUM CHLORIDE 600; 310; 30; 20 MG/100ML; MG/100ML; MG/100ML; MG/100ML
INJECTION, SOLUTION INTRAVENOUS CONTINUOUS PRN
Status: DISCONTINUED | OUTPATIENT
Start: 2025-01-02 | End: 2025-01-02

## 2025-01-02 RX ORDER — MIDAZOLAM HYDROCHLORIDE 2 MG/2ML
INJECTION, SOLUTION INTRAMUSCULAR; INTRAVENOUS AS NEEDED
Status: DISCONTINUED | OUTPATIENT
Start: 2025-01-02 | End: 2025-01-02

## 2025-01-02 RX ADMIN — LIDOCAINE HYDROCHLORIDE 50 MG: 10 INJECTION, SOLUTION EPIDURAL; INFILTRATION; INTRACAUDAL; PERINEURAL at 17:22

## 2025-01-02 RX ADMIN — SODIUM CHLORIDE, SODIUM LACTATE, POTASSIUM CHLORIDE, AND CALCIUM CHLORIDE: .6; .31; .03; .02 INJECTION, SOLUTION INTRAVENOUS at 17:19

## 2025-01-02 RX ADMIN — MIDAZOLAM 2 MG: 1 INJECTION INTRAMUSCULAR; INTRAVENOUS at 17:19

## 2025-01-02 RX ADMIN — KETOROLAC TROMETHAMINE 30 MG: 30 INJECTION, SOLUTION INTRAMUSCULAR; INTRAVENOUS at 17:32

## 2025-01-02 NOTE — CONSULTS
Consultation - Gastroenterology   Name: Daniel Somers 39 y.o. male I MRN: [id number]  Unit/Bed#: ED 05 I Date of Admission: 1/2/2025   Date of Service: 1/2/2025 I Hospital Day: 0   Inpatient consult to gastroenterology  Consult performed by: Mirtha Everett MD  Consult ordered by: Jose Lee MD        Physician Requesting Evaluation: Jose Lee MD   Reason for Evaluation / Principal Problem: Food impaction    Assessment & Plan  Food impaction of esophagus  Patient is presenting to the emergency department after having an episode of food impaction after eating egg salad sandwich for lunch.  Has a history of food impaction in the past due to EOE has been off of any therapy for treatment for the past 6 months.    -Keep patient n.p.o., will take patient for emergent EGD for food impaction removal  -Case discussed with anesthesia      History of Present Illness   HPI:  Daniel Somers is a 39 y.o. male who presents to the ED with a food impaction.  Past medical history is notable for paroxysmal A-fib not on anticoagulation and remote history of EOE 10 years ago.  Patient states he has been having random bouts of dysphagia that occur every 3 to 6 months and he is able to pass food by simple maneuvers or is able to induce vomiting when he feels like food is getting caught. Patient states about 10 years ago he was diagnosed with EOE and was supposed to be on PPI therapy.  He took PPI therapy until about 6 months ago.  Today he was eating lunch and had a XL sandwich after which started having difficulty swallowing solids and liquids.    Review of Systems  Per HPI    Objective :  Temp:  [98.6 °F (37 °C)] 98.6 °F (37 °C)  HR:  [100] 100  BP: (133)/(80) 133/80  Resp:  [18] 18  SpO2:  [99 %] 99 %  O2 Device: None (Room air)    Physical Exam  Pulmonary:      Effort: Pulmonary effort is normal.   Abdominal:      General: Abdomen is flat.      Palpations: Abdomen is soft.   Skin:     General: Skin is warm  and dry.   Neurological:      Mental Status: He is alert.   Psychiatric:         Mood and Affect: Mood normal.           Lab Results: I have reviewed the following results:

## 2025-01-02 NOTE — ED PROVIDER NOTES
Time reflects when diagnosis was documented in both MDM as applicable and the Disposition within this note       Time User Action Codes Description Comment    1/2/2025  4:47 PM Maryamholly Jose SOLIZ Add [T18.128A,  W44.F3XA] Food impaction of esophagus, initial encounter     1/2/2025  4:56 PM Jose Lee Add [T18.128A,  W44.F3XA] Esophageal obstruction due to food impaction     1/2/2025  5:29 PM Porsha Hutchinson Modify [T18.128A,  W44.F3XA] Food impaction of esophagus, initial encounter     1/2/2025  5:29 PM Porsha Hutchinson Modify [T18.128A,  W44.F3XA] Esophageal obstruction due to food impaction           ED Disposition       ED Disposition   Send to OR    Condition   --    Date/Time   Thu Jan 2, 2025  4:56 PM    Comment   --             Assessment & Plan       Medical Decision Making  Patient evaluated with GI at bedside.  Plan is to take him to the OR for emergent EGD.    Problems Addressed:  Esophageal obstruction due to food impaction: acute illness or injury  Food impaction of esophagus, initial encounter: acute illness or injury    Amount and/or Complexity of Data Reviewed  Discussion of management or test interpretation with external provider(s): Discussed with GI at bedside    Risk  Decision regarding hospitalization.             Medications - No data to display    ED Risk Strat Scores                                              History of Present Illness       Chief Complaint   Patient presents with    Foreign Body in Throat     Pt c/o food bolus x 2 hours, prior hx of same and has been able to clear on own in past        History reviewed. No pertinent past medical history.   Past Surgical History:   Procedure Laterality Date    CARDIAC ELECTROPHYSIOLOGY PROCEDURE N/A 11/14/2024    Procedure: Cardiac loop recorder implant;  Surgeon: Blue Galloway DO;  Location: [facility];  Service: Cardiology      History reviewed. No pertinent family history.   Social History     Tobacco Use    Smoking status:  Never    Smokeless tobacco: Never   Substance Use Topics    Alcohol use: Yes     Comment: socially    Drug use: Not Currently      E-Cigarette/Vaping      E-Cigarette/Vaping Substances      I have reviewed and agree with the history as documented.     Presents for evaluation due to 2 hours of inability of his food to pass through his esophagus into his stomach.  Patient has a history of eosinophilic esophagitis and states that he often has some difficulty with food passage in the esophagus; however, he states that he has not required an EGD due to an impaction previously.  Patient states that he tried all of his usual home remedies and has been unable to pass the food.  He states the last thing he ate was an egg salad wrap.  No additional complaints.      Foreign Body in Throat  Associated symptoms: nausea and vomiting    Associated symptoms: no abdominal pain        Review of Systems   Constitutional:  Negative for chills and fever.   Gastrointestinal:  Positive for nausea and vomiting. Negative for abdominal pain.   All other systems reviewed and are negative.          Objective       ED Triage Vitals [01/02/25 1634]   Temperature Pulse Blood Pressure Respirations SpO2 Patient Position - Orthostatic VS   98.6 °F (37 °C) 100 133/80 18 99 % Sitting      Temp Source Heart Rate Source BP Location FiO2 (%) Pain Score    Tympanic Monitor Right arm -- --      Vitals      Date and Time Temp Pulse SpO2 Resp BP Pain Score FACES Pain Rating User   01/02/25 1634 98.6 °F (37 °C) 100 99 % 18 133/80 -- -- MV            Physical Exam  Vitals reviewed.   Constitutional:       Appearance: Normal appearance.   HENT:      Head: Normocephalic and atraumatic.      Nose: Nose normal.      Mouth/Throat:      Pharynx: Oropharynx is clear.   Eyes:      Extraocular Movements: Extraocular movements intact.      Pupils: Pupils are equal, round, and reactive to light.   Cardiovascular:      Rate and Rhythm: Normal rate.   Pulmonary:       Effort: Pulmonary effort is normal. No respiratory distress.   Musculoskeletal:      Cervical back: Normal range of motion.   Neurological:      General: No focal deficit present.      Mental Status: He is alert and oriented to person, place, and time.   Psychiatric:         Mood and Affect: Mood normal.         Behavior: Behavior normal.         Results Reviewed       None            No orders to display       Procedures    ED Medication and Procedure Management   Prior to Admission Medications   Prescriptions Last Dose Informant Patient Reported? Taking?   SUMAtriptan (IMITREX) 6 mg/0.5 mL   No No   Sig: Inject 0.5 mL (6 mg total) under the skin daily as needed (for cluster )   indomethacin (INDOCIN) 50 mg capsule   No No   Sig: Take 1 capsule (50 mg total) by mouth daily as needed (for cluster attack)   nebivolol (BYSTOLIC) 5 mg tablet   No No   Sig: Take 1 tablet (5 mg total) by mouth daily   omeprazole (PriLOSEC) 20 mg delayed release capsule   Yes No   Sig: Take 20 mg by mouth   tobramycin-dexamethasone (TOBRADEX) ophthalmic suspension   No No   Sig: Apply 1 drop to eye every 4 (four) hours while awake   Patient not taking: Reported on 8/10/2023      Facility-Administered Medications: None     Current Discharge Medication List        START taking these medications    Details   !! omeprazole (PriLOSEC) 40 MG capsule Take 1 capsule (40 mg total) by mouth 2 (two) times a day  Qty: 180 capsule, Refills: 2    Associated Diagnoses: Food impaction of esophagus, initial encounter; Esophageal obstruction due to food impaction       !! - Potential duplicate medications found. Please discuss with provider.        CONTINUE these medications which have NOT CHANGED    Details   indomethacin (INDOCIN) 50 mg capsule Take 1 capsule (50 mg total) by mouth daily as needed (for cluster attack)  Qty: 60 capsule, Refills: 0    Associated Diagnoses: Episodic cluster headache, not intractable      nebivolol (BYSTOLIC) 5 mg  Yes tablet Take 1 tablet (5 mg total) by mouth daily  Qty: 90 tablet, Refills: 3    Associated Diagnoses: Paroxysmal atrial fibrillation (HCC)      !! omeprazole (PriLOSEC) 20 mg delayed release capsule Take 20 mg by mouth       !! - Potential duplicate medications found. Please discuss with provider.        STOP taking these medications       SUMAtriptan (IMITREX) 6 mg/0.5 mL Comments:   Reason for Stopping:         tobramycin-dexamethasone (TOBRADEX) ophthalmic suspension Comments:   Reason for Stopping:             No discharge procedures on file.  ED SEPSIS DOCUMENTATION   Time reflects when diagnosis was documented in both MDM as applicable and the Disposition within this note       Time User Action Codes Description Comment    1/2/2025  4:47 PM Jose Lee Add [T18.128A,  W44.F3XA] Food impaction of esophagus, initial encounter     1/2/2025  4:56 PM Jose Lee Add [T18.128A,  W44.F3XA] Esophageal obstruction due to food impaction     1/2/2025  5:29 PM Porsha Hutchinson Modify [T18.128A,  W44.F3XA] Food impaction of esophagus, initial encounter     1/2/2025  5:29 PM Porsha Hutchinson Modify [T18.128A,  W44.F3XA] Esophageal obstruction due to food impaction                  Jose Lee MD  01/02/25 9266

## 2025-01-02 NOTE — ANESTHESIA PREPROCEDURE EVALUATION
Procedure:  EGD    Relevant Problems   ANESTHESIA (within normal limits)      NEURO/PSYCH   (+) Episodic cluster headache, not intractable      PULMONARY   (+) MUNIRA (obstructive sleep apnea)        Physical Exam    Airway    Mallampati score: II  TM Distance: >3 FB  Neck ROM: full     Dental       Cardiovascular  Rate: normal    Pulmonary  Pulmonary exam normal     Other Findings  Per pt denies anything remaining that is loose or removeable      Anesthesia Plan  ASA Score- 2 Emergent    Anesthesia Type- general with ASA Monitors.         Additional Monitors:     Airway Plan: ETT.           Plan Factors-Exercise tolerance (METS): >4 METS.    Chart reviewed.    Patient summary reviewed.    Patient is not a current smoker.              Induction- intravenous.    Postoperative Plan-         Informed Consent- Anesthetic plan and risks discussed with patient.  I personally reviewed this patient with the CRNA. Discussed and agreed on the Anesthesia Plan with the CRNA..

## 2025-01-02 NOTE — ASSESSMENT & PLAN NOTE
Patient is presenting to the emergency department after having an episode of food impaction after eating egg salad sandwich for lunch.  Has a history of food impaction in the past due to EOE has been off of any therapy for treatment for the past 6 months.    -Keep patient n.p.o., will take patient for emergent EGD for food impaction removal  -Case discussed with anesthesia

## 2025-01-02 NOTE — ANESTHESIA POSTPROCEDURE EVALUATION
Post-Op Assessment Note    CV Status:  Stable  Pain Score: 0    Pain management: adequate       Mental Status:  Alert and awake   Hydration Status:  Euvolemic   PONV Controlled:  None   Airway Patency:  Patent     Post Op Vitals Reviewed: Yes    No anethesia notable event occurred.    Staff: Anesthesiologist, CRNA   Comments: report given to RN; VSS; RA      Last Filed PACU Vitals:  Vitals Value Taken Time   Temp     Pulse     BP     Resp     SpO2

## 2025-01-06 ENCOUNTER — TELEPHONE (OUTPATIENT)
Dept: GASTROENTEROLOGY | Facility: CLINIC | Age: 40
End: 2025-01-06

## 2025-01-06 PROCEDURE — 88305 TISSUE EXAM BY PATHOLOGIST: CPT | Performed by: PATHOLOGY

## 2025-01-06 NOTE — ANESTHESIA POSTPROCEDURE EVALUATION
Post-Op Assessment Note    Last Filed PACU Vitals:  Vitals Value Taken Time   Temp 97.4 °F (36.3 °C) 01/02/25 1816   Pulse 73 01/02/25 1816   /74 01/02/25 1816   Resp 55 01/02/25 1811   SpO2 97 % 01/02/25 1816   Vitals shown include unfiled device data.    Modified Angela:     Vitals Value Taken Time   Activity 2 01/02/25 1745   Respiration 2 01/02/25 1745   Circulation 2 01/02/25 1745   Consciousness 2 01/02/25 1745   Oxygen Saturation 2 01/02/25 1745     Modified Angela Score: 10

## 2025-01-30 ENCOUNTER — TELEPHONE (OUTPATIENT)
Dept: GASTROENTEROLOGY | Facility: CLINIC | Age: 40
End: 2025-01-30

## 2025-01-30 ENCOUNTER — TELEMEDICINE (OUTPATIENT)
Dept: GASTROENTEROLOGY | Facility: CLINIC | Age: 40
End: 2025-01-30
Payer: COMMERCIAL

## 2025-01-30 DIAGNOSIS — T18.128D FOOD IMPACTION OF ESOPHAGUS, SUBSEQUENT ENCOUNTER: ICD-10-CM
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
## 2025-01-30 DIAGNOSIS — K20.0 EOSINOPHILIC ESOPHAGITIS: Primary | ICD-10-CM

## 2025-01-30 PROCEDURE — 99214 OFFICE O/P EST MOD 30 MIN: CPT | Performed by: INTERNAL MEDICINE

## 2025-01-30 NOTE — PATIENT INSTRUCTIONS
Called patient to schedule EGD in 6 month.  Patient would like to schedule at a later day, recall set for 3 months.

## 2025-02-08 PROBLEM — K20.0 EOSINOPHILIC ESOPHAGITIS: Status: ACTIVE | Noted: 2025-02-08

## 2025-02-08 NOTE — PROGRESS NOTES
Virtual Regular Visit  Name: Daniel Somers      : 1985      MRN: [id number]  Encounter Provider: Brodie Boyce MD  Encounter Date: 2025   Encounter department: [facility] [place]      Verification of patient location:  Patient is located at Home in the following state in which I hold an active license [place] :  Assessment & Plan  Food impaction of esophagus, subsequent encounter  No further episodes since January symptoms are improved or stabilized with PPI therapy  Orders:    EGD; Future    Eosinophilic esophagitis  Interested in multiple modality for treatment options including dietary modifications he plans to limit majority of the food groups associated significant esophagitis.  After discussing treatment modality option including Dupixent, PPI, budesonide we plan to focus on PPI therapies at this time.  Will plan to repeat endoscopic evaluation in several months to further quantify eosinophilia to determine long-term management.  Orders:    EGD; Future          Encounter provider Brodie Boyce MD    The patient was identified by name and date of birth. Daniel Somers was informed that this is a telemedicine visit and that the visit is being conducted through the Epic Embedded platform. He agrees to proceed..  My office door was closed. No one else was in the room.  He acknowledged consent and understanding of privacy and security of the video platform. The patient has agreed to participate and understands they can discontinue the visit at any time.    Patient is aware this is a billable service.     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  He has had  longstanding history of food impaction but due to busy work schedule as well as multiple  relocation due to his busy career he has not  had consistent follow-up.  Now he would like to establish care and discuss treatment options as we confirm diagnosis eosinophilic esophagitis.    He is now interested in long-term therapy but would also like to avoid therapies which may result in complication in the future.  He is hesitant of being on Dupixent especially if coverage will be an issue in the future.  No alarm symptoms at this time.  EGD with biopsies demonstrated eosinophilia of 40-60 eos per high-power field in proximal and distal esophagus.  He has not been on consistent long-term therapy.    HPI  Review of Systems   Constitutional: Negative.    HENT: Negative.     Eyes: Negative.    Respiratory: Negative.     Cardiovascular: Negative.    Gastrointestinal:  Negative for abdominal distention, abdominal pain, anal bleeding, blood in stool, constipation, diarrhea, nausea, rectal pain and vomiting.   Endocrine: Negative.    Musculoskeletal: Negative.    Skin: Negative.    Allergic/Immunologic: Negative.    Neurological: Negative.    Hematological: Negative.    Psychiatric/Behavioral: Negative.         Objective   There were no vitals taken for this visit.    Physical Exam  HENT:      Head: Normocephalic and atraumatic.   Cardiovascular:      Rate and Rhythm: Normal rate and regular rhythm.   Pulmonary:      Effort: Pulmonary effort is normal.      Breath sounds: Normal breath sounds.   Abdominal:      General: Bowel sounds are normal. There is no distension.      Palpations: Abdomen is soft.      Tenderness: There is no abdominal tenderness. There is no rebound.   Musculoskeletal:      Cervical back: Normal range of motion.   Skin:     General: Skin is warm.   Neurological:      Mental Status: He is oriented to person, place, and time.

## 2025-04-08 ENCOUNTER — TELEPHONE (OUTPATIENT)
Dept: GASTROENTEROLOGY | Facility: CLINIC | Age: 40
End: 2025-04-08

## 2025-04-08 NOTE — TELEPHONE ENCOUNTER
Called and left a message for the patient to call back to schedule EGD in June/July/August with Dr Boyce. Sent letter via myc

## 2025-04-21 ENCOUNTER — TELEPHONE (OUTPATIENT)
Dept: GASTROENTEROLOGY | Facility: CLINIC | Age: 40
End: 2025-04-21

## 2025-04-21 NOTE — TELEPHONE ENCOUNTER
Called pt, lvm to call so that we can assist pt with scheduling the EGD with Dr. Boyce in July/August.

## 2025-04-22 ENCOUNTER — REMOTE DEVICE CLINIC VISIT (OUTPATIENT)
Dept: CARDIOLOGY CLINIC | Facility: CLINIC | Age: 40
End: 2025-04-22
Payer: COMMERCIAL

## 2025-04-22 DIAGNOSIS — I48.0 PAROXYSMAL ATRIAL FIBRILLATION (HCC): Primary | ICD-10-CM

## 2025-04-22 PROCEDURE — 93298 REM INTERROG DEV EVAL SCRMS: CPT | Performed by: INTERNAL MEDICINE

## 2025-04-22 NOTE — PROGRESS NOTES
"MDT LNQ22 ILR/ ACTIVE SYSTEM IS MRI CONDITIONAL   CARELINK TRANSMISSION: LOOP RECORDER. PRESENTING RHYTHM NSR @ 67 BPM. BATTERY STATUS \"OK.\" 2 TACHY EPISODES W/ EGRAMS SHOWING OVERSENSING. NO PATIENT OR NEW DEVICE ACTIVATED EPISODES. NORMAL DEVICE FUNCTION. DL   "

## 2025-04-26 ENCOUNTER — RESULTS FOLLOW-UP (OUTPATIENT)
Dept: CARDIOLOGY CLINIC | Facility: CLINIC | Age: 40
End: 2025-04-26

## 2025-07-22 ENCOUNTER — REMOTE DEVICE CLINIC VISIT (OUTPATIENT)
Dept: CARDIOLOGY CLINIC | Facility: CLINIC | Age: 40
End: 2025-07-22
Payer: COMMERCIAL

## 2025-07-22 DIAGNOSIS — I48.0 PAROXYSMAL ATRIAL FIBRILLATION (HCC): Primary | ICD-10-CM

## 2025-07-22 DIAGNOSIS — I47.9 PAROXYSMAL TACHYCARDIA (HCC): ICD-10-CM

## 2025-07-22 PROCEDURE — 93298 REM INTERROG DEV EVAL SCRMS: CPT | Performed by: INTERNAL MEDICINE

## 2025-07-22 NOTE — PROGRESS NOTES
"MDT LNQ22 ILR/ ACTIVE SYSTEM IS MRI CONDITIONAL   CARELINK TRANSMISSION: LOOP RECORDER. PRESENTING RHYTHM NSR @ 75 BPM. BATTERY STATUS \"OK.\" 2 TACHY EPISODES W/ EGRAM SHOWING NOISE. NO PATIENT OR NEW DEVICE ACTIVATED EPISODES. NORMAL DEVICE FUNCTION. DL   "

## (undated) RX ORDER — BROMFENAC SODIUM 0.7 MG/ML: 1 SOLUTION/ DROPS OPHTHALMIC ONCE A DAY